# Patient Record
Sex: FEMALE | Race: WHITE | Employment: OTHER | URBAN - METROPOLITAN AREA
[De-identification: names, ages, dates, MRNs, and addresses within clinical notes are randomized per-mention and may not be internally consistent; named-entity substitution may affect disease eponyms.]

---

## 2018-02-14 ENCOUNTER — OFFICE VISIT (OUTPATIENT)
Dept: OPHTHALMOLOGY | Facility: CLINIC | Age: 83
End: 2018-02-14
Payer: COMMERCIAL

## 2018-02-14 DIAGNOSIS — Z96.1 PSEUDOPHAKIA: Primary | ICD-10-CM

## 2018-02-14 DIAGNOSIS — H35.371 EPIRETINAL MEMBRANE (ERM) OF RIGHT EYE: ICD-10-CM

## 2018-02-14 DIAGNOSIS — H43.813 POSTERIOR VITREOUS DETACHMENT OF BOTH EYES: ICD-10-CM

## 2018-02-14 PROBLEM — H35.373 EPIRETINAL MEMBRANE, BOTH EYES: Status: ACTIVE | Noted: 2018-02-14

## 2018-02-14 PROCEDURE — 92004 COMPRE OPH EXAM NEW PT 1/>: CPT | Performed by: OPHTHALMOLOGY

## 2018-02-14 PROCEDURE — 92134 CPTRZ OPH DX IMG PST SGM RTA: CPT | Performed by: OPHTHALMOLOGY

## 2018-02-14 RX ORDER — LISINOPRIL 10 MG/1
10 TABLET ORAL DAILY
Refills: 0 | COMMUNITY
Start: 2018-01-15

## 2018-02-14 ASSESSMENT — REFRACTION_WEARINGRX
OS_AXIS: 108
OS_SPHERE: -1.50
OD_CYLINDER: +0.50
OD_SPHERE: -1.00
OS_ADD: +2.50
SPECS_TYPE: PAL
OD_ADD: +2.50
OS_CYLINDER: +0.25
OD_AXIS: 045

## 2018-02-14 ASSESSMENT — REFRACTION_MANIFEST
OS_SPHERE: -1.75
OS_AXIS: 150
OD_AXIS: 029
OD_SPHERE: -1.75
OD_CYLINDER: +0.75
OS_CYLINDER: +0.25

## 2018-02-14 ASSESSMENT — SLIT LAMP EXAM - LIDS
COMMENTS: 2, DERMATOCHALASIS
COMMENTS: 2, DERMATOCHALASIS

## 2018-02-14 ASSESSMENT — VISUAL ACUITY
OD_CC+: -1+1
OS_CC: 20/50
METHOD: SNELLEN - LINEAR
OS_PH_CC: 20/30-2
OD_CC: 20/40
CORRECTION_TYPE: GLASSES
OS_CC+: -2
METHOD_MR: NO CHARGE, DIAGNOSTIC

## 2018-02-14 ASSESSMENT — CUP TO DISC RATIO
OD_RATIO: 0.4
OS_RATIO: 0.4

## 2018-02-14 ASSESSMENT — EXTERNAL EXAM - LEFT EYE: OS_EXAM: NORMAL

## 2018-02-14 ASSESSMENT — TONOMETRY
OD_IOP_MMHG: 16
OS_IOP_MMHG: 16
IOP_METHOD: APPLANATION

## 2018-02-14 ASSESSMENT — EXTERNAL EXAM - RIGHT EYE: OD_EXAM: NORMAL

## 2018-02-14 NOTE — PATIENT INSTRUCTIONS
Possible clouding of posterior capsule discussed.   Continue same glasses.  Use artificial tears up to 4 times daily both eyes. (Refresh Tears or Systane Ultra/Balance)   Call in October 2018 for an appointment in February 2019 for Complete Exam    Dr. Virgen [phone]

## 2018-02-14 NOTE — PROGRESS NOTES
Current Eye Medications: none     Subjective:  Consult from for retinal eval.  Referred from a good friend Lori Johnson  Saw an OD in July in [facility], was told to see Retina   Got scared and cancelled the appointments.  Had cataract surgery in both eyes about 10 years ago, says she does see a lacy film over the vision now in both eyes. Also blurred in the center vision of both eyes.  Has a small red bump on the left lower eyelid that was a stye.     Objective:  See Ophthalmology Exam.       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      Plan:  Possible clouding of posterior capsule discussed.   Continue same glasses.  Use artificial tears up to 4 times daily both eyes. (Refresh Tears or Systane Ultra/Balance)   Call in October 2018 for an appointment in February 2019 for Complete Exam    Dr. Virgen [phone]

## 2018-02-14 NOTE — LETTER
2/14/2018         RE: Corry Zelaya  [address] [address]        Dear Colleague,    Thank you for referring your patient, Corry Zelaya, to the [facility]. Please see a copy of my visit note below.     Current Eye Medications: none     Subjective:  Consult from for retinal eval.  Referred from a good friend Lori Johnson  Saw an OD in July in [facility], was told to see Retina   Got scared and cancelled the appointments.  Had cataract surgery in both eyes about 10 years ago, says she does see a lacy film over the vision now in both eyes. Also blurred in the center vision of both eyes.  Has a small red bump on the left lower eyelid that was a stye.     Objective:  See Ophthalmology Exam.       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      Plan:  Possible clouding of posterior capsule discussed.   Continue same glasses.  Use artificial tears up to 4 times daily both eyes. (Refresh Tears or Systane Ultra/Balance)   Call in October 2018 for an appointment in February 2019 for Complete Exam    Dr. Virgen [phone]         Again, thank you for allowing me to participate in the care of your patient.        Sincerely,        Uzair Virgen MD

## 2018-02-14 NOTE — MR AVS SNAPSHOT
"              After Visit Summary   2/14/2018    Corry Zelaya    MRN: [id number]           Patient Information     Date Of Birth          5/2/1934        Visit Information        Provider Department      2/14/2018 9:45 AM Uzair Virgen MD [facility]        Today's Diagnoses     Pseudophakia    -  1    Epiretinal membrane, both eyes        Posterior vitreous detachment of both eyes          Care Instructions    Possible clouding of posterior capsule discussed.   Continue same glasses.  Use artificial tears up to 4 times daily both eyes. (Refresh Tears or Systane Ultra/Balance)   Call in October 2018 for an appointment in February 2019 for Complete Exam    Dr. Virgen [phone]          Follow-ups after your visit        Who to contact     If you have questions or need follow up information about today's clinic visit or your schedule please contact [facility] directly at [phone].  Normal or non-critical lab and imaging results will be communicated to you by MyChart, letter or phone within 4 business days after the clinic has received the results. If you do not hear from us within 7 days, please contact the clinic through [employer] or phone. If you have a critical or abnormal lab result, we will notify you by phone as soon as possible.  Submit refill requests through [employer] or call your pharmacy and they will forward the refill request to us. Please allow 3 business days for your refill to be completed.          Additional Information About Your Visit        MyChart Information     [employer] lets you send messages to your doctor, view your test results, renew your prescriptions, schedule appointments and more. To sign up, go to [email or web address] . Click on \"Log in\" on the left side of the screen, which will take you to the Welcome page. Then click on \"Sign up Now\" on the right side of the page.     You will be asked to enter the access code listed below, as well as some " personal information. Please follow the directions to create your username and password.     Your access code is: [email or web address]  Expires: 5/15/2018 10:38 AM     Your access code will  in 90 days. If you need help or a new code, please call your [facility] clinic or [phone].        Care EveryWhere ID     This is your Care EveryWhere ID. This could be used by other organizations to access your [facility] medical records  [id number]         Blood Pressure from Last 3 Encounters:   No data found for BP    Weight from Last 3 Encounters:   No data found for Wt              We Performed the Following     [facility]ER[facility] OPHTHAL[facility]INA        Primary Care Provider Fax #    Physician No Ref-Primary [phone]       No address on file        Equal Access to Services     MIHAI WATTERS : Carla Zarate, waelva delgado, qaybta kaalmada gris, azul lopez . [facility] [phone].    ATENCIÓN: Si habla español, tiene a walton disposición servicios gratuitos de asistencia lingüística. Llame al [phone].    We comply with applicable federal civil rights laws and [place] laws. We do not discriminate on the basis of race, color, national origin, age, disability, sex, sexual orientation, or gender identity.            Thank you!     Thank you for choosing [facility] [place]  for your care. Our goal is always to provide you with excellent care. Hearing back from our patients is one way we can continue to improve our services. Please take a few minutes to complete the written survey that you may receive in the mail after your visit with us. Thank you!             Your Updated Medication List - Protect others around you: Learn how to safely use, store and throw away your medicines at www.disposemymeds.org.          This list is accurate as of 18 10:38 AM.  Always use your most recent med list.                   Brand Name Dispense Instructions for use  Diagnosis    lisinopril 10 MG tablet    PRINIVIL/ZESTRIL     Take 10 mg by mouth daily

## 2018-02-16 PROBLEM — H35.371 EPIRETINAL MEMBRANE (ERM) OF RIGHT EYE: Status: ACTIVE | Noted: 2018-02-16

## 2018-02-16 PROBLEM — H35.373 EPIRETINAL MEMBRANE, BOTH EYES: Status: RESOLVED | Noted: 2018-02-14 | Resolved: 2018-02-16

## 2020-02-26 ENCOUNTER — OFFICE VISIT (OUTPATIENT)
Dept: OPHTHALMOLOGY | Facility: CLINIC | Age: 85
End: 2020-02-26
Payer: COMMERCIAL

## 2020-02-26 DIAGNOSIS — Z96.1 PSEUDOPHAKIA: ICD-10-CM

## 2020-02-26 DIAGNOSIS — H53.9 VISUAL DISTURBANCE: Primary | ICD-10-CM

## 2020-02-26 DIAGNOSIS — G44.52 NEW DAILY PERSISTENT HEADACHE: ICD-10-CM

## 2020-02-26 DIAGNOSIS — H35.371 EPIRETINAL MEMBRANE (ERM) OF RIGHT EYE: ICD-10-CM

## 2020-02-26 DIAGNOSIS — R70.0 ELEVATED SED RATE: Primary | ICD-10-CM

## 2020-02-26 DIAGNOSIS — H53.10 SUBJECTIVE VISUAL DISTURBANCE: ICD-10-CM

## 2020-02-26 DIAGNOSIS — H43.813 POSTERIOR VITREOUS DETACHMENT OF BOTH EYES: ICD-10-CM

## 2020-02-26 LAB
CRP SERPL-MCNC: <2.9 MG/L (ref 0–8)
ERYTHROCYTE [SEDIMENTATION RATE] IN BLOOD BY WESTERGREN METHOD: 104 MM/H (ref 0–30)

## 2020-02-26 PROCEDURE — 92012 INTRM OPH EXAM EST PATIENT: CPT | Performed by: OPHTHALMOLOGY

## 2020-02-26 PROCEDURE — 86140 C-REACTIVE PROTEIN: CPT | Performed by: OPHTHALMOLOGY

## 2020-02-26 PROCEDURE — 36415 COLL VENOUS BLD VENIPUNCTURE: CPT | Performed by: OPHTHALMOLOGY

## 2020-02-26 PROCEDURE — 92134 CPTRZ OPH DX IMG PST SGM RTA: CPT | Performed by: OPHTHALMOLOGY

## 2020-02-26 PROCEDURE — 85652 RBC SED RATE AUTOMATED: CPT | Performed by: OPHTHALMOLOGY

## 2020-02-26 RX ORDER — METOPROLOL TARTRATE 25 MG/1
25 TABLET, FILM COATED ORAL 2 TIMES DAILY
COMMUNITY

## 2020-02-26 RX ORDER — METOPROLOL SUCCINATE 25 MG/1
TABLET, EXTENDED RELEASE ORAL EVERY 24 HOURS
COMMUNITY

## 2020-02-26 RX ORDER — ASPIRIN 81 MG/1
81 TABLET, CHEWABLE ORAL DAILY
COMMUNITY
End: 2020-02-26 | Stop reason: ALTCHOICE

## 2020-02-26 RX ORDER — ACETAMINOPHEN 160 MG
TABLET,DISINTEGRATING ORAL
COMMUNITY

## 2020-02-26 RX ORDER — PREDNISONE 20 MG/1
40 TABLET ORAL 2 TIMES DAILY
Qty: 60 TABLET | Refills: 1 | Status: SHIPPED | OUTPATIENT
Start: 2020-02-26

## 2020-02-26 ASSESSMENT — SLIT LAMP EXAM - LIDS
COMMENTS: 2+ DERMATOCHALASIS
COMMENTS: 2+ DERMATOCHALASIS

## 2020-02-26 ASSESSMENT — VISUAL ACUITY
OD_CC+: -2
OS_CC: 20/30
OD_CC: 20/40
CORRECTION_TYPE: GLASSES
METHOD: SNELLEN - LINEAR
OS_CC+: -2

## 2020-02-26 ASSESSMENT — TONOMETRY
OS_IOP_MMHG: 17
IOP_METHOD: APPLANATION
OD_IOP_MMHG: 17

## 2020-02-26 ASSESSMENT — CUP TO DISC RATIO
OD_RATIO: 0.4
OS_RATIO: 0.4

## 2020-02-26 ASSESSMENT — EXTERNAL EXAM - LEFT EYE: OS_EXAM: NORMAL

## 2020-02-26 ASSESSMENT — EXTERNAL EXAM - RIGHT EYE: OD_EXAM: NORMAL

## 2020-02-26 NOTE — LETTER
"    2/26/2020         RE: Corry Zelaya  [address] [place] [address]        Dear Colleague,    Thank you for referring your patient, Corry Zelaya, to the [facility]. Please see a copy of my visit note below.     Current Eye Medications:  None.       Subjective:  Starting on February 7th she has had more consistent episodes of mobile, zig-zag lines, in her peripheral vision of each eye, along with blurry central vision.  Occasionally she has a headache before the visual part, but other times the headache follows the visual symptoms.  She usually takes Advil at the onset of either the headache or visual symptoms.  An event occurred again yesterday which remained for about 2.5 hours total.   Overall, she feels her reading vision is decreasing.  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    She was recently hospitalized for stroke and heart symptoms.      Had unremarkable Echocardiogram, Carotid US, and brain MRI.  Headaches almost daily for 2.5 hours, eye symptoms not as long.  Some relief with nonsteroidal antiinflammatories.  Was on 325 ASA, now 81.  Had 30 day cardiac monitoring; not yet reported.  Denies jaw, scalp pain; no fever, significant weight loss.     Objective:  See Ophthalmology Exam.       Assessment:  Daily recent headaches with transient visual disturbances of indeterminate etiology.      Plan:  Obtain labs today (ESR, CRP) - will call with results.  Obtain OCT today - will call with any concerns.  Recommend seeing Neurology for evaluation.  Signs and symptoms of retinal detachment (shower of black floaters, frequent flashing even during day, curtain over part of visual field) discussed.  Return visit in 1 month for refraction and intraocular pressure check.     NB: ESR significantly elevated; CRP pending.  Discussed with patient.  " Will start Prednisone 40 mg twice daily and refer to AM for temporal artery biopsy in near future.    Uzair Virgen M.D.  [phone]           Again, thank you for allowing me to participate in the care of your patient.        Sincerely,        Uzair Virgen MD

## 2020-02-26 NOTE — PATIENT INSTRUCTIONS
Obtain labs today - will call with results.  Obtain OCT today - will call with any concerns.  Recommend seeing Neurology for evaluation.  Signs and symptoms of retinal detachment (shower of black floaters, frequent flashing even during day, curtain over part of visual field) discussed.  Return visit in 1 month for refraction and intraocular pressure check.     Uzair Virgen M.D.  [phone]

## 2020-02-26 NOTE — PROGRESS NOTES
" Current Eye Medications:  None.       Subjective:  Starting on February 7th she has had more consistent episodes of mobile, zig-zag lines, in her peripheral vision of each eye, along with blurry central vision.  Occasionally she has a headache before the visual part, but other times the headache follows the visual symptoms.  She usually takes Advil at the onset of either the headache or visual symptoms.  An event occurred again yesterday which remained for about 2.5 hours total.   Overall, she feels her reading vision is decreasing.  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    She was recently hospitalized for stroke and heart symptoms.      Had unremarkable Echocardiogram, Carotid US, and brain MRI.  Headaches almost daily for 2.5 hours, eye symptoms not as long.  Some relief with nonsteroidal antiinflammatories.  Was on 325 ASA, now 81.  Had 30 day cardiac monitoring; not yet reported.  Denies jaw, scalp pain; no fever, significant weight loss.     Objective:  See Ophthalmology Exam.       Assessment:  Daily recent headaches with transient visual disturbances of indeterminate etiology.      Plan:  Obtain labs today (ESR, CRP) - will call with results.  Obtain OCT today - will call with any concerns.  Recommend seeing Neurology for evaluation.  Signs and symptoms of retinal detachment (shower of black floaters, frequent flashing even during day, curtain over part of visual field) discussed.  Return visit in 1 month for refraction and intraocular pressure check.     NB: ESR significantly elevated; CRP pending.  Discussed with patient.  Will start Prednisone 40 mg twice daily and refer to AM for temporal artery biopsy in near future.    Uzair Virgen M.D.  [phone]         "

## 2020-02-27 ENCOUNTER — ANESTHESIA EVENT (OUTPATIENT)
Dept: SURGERY | Facility: AMBULATORY SURGERY CENTER | Age: 85
End: 2020-02-27

## 2020-02-27 ASSESSMENT — MIFFLIN-ST. JEOR: SCORE: 1097.25

## 2020-02-27 NOTE — ANESTHESIA PREPROCEDURE EVALUATION
"Anesthesia Pre-Procedure Evaluation    Patient: Corry Zelaya   MRN:     [id number] Gender:   female   Age:    85 year old :      1934        Preoperative Diagnosis: Elevated sed rate [R70.0]  Subjective visual disturbance [H53.10]   Procedure(s):  Left or right temporal artery biopsy     LABS:  CBC: No results found for: WBC, HGB, HCT, PLT  BMP: No results found for: NA, POTASSIUM, CHLORIDE, CO2, BUN, CR, GLC  COAGS: No results found for: PTT, INR, FIBR  POC: No results found for: BGM, HCG, HCGS  OTHER:   Lab Results   Component Value Date    CRP <2.9 2020     (H) 2020        Preop Vitals    BP Readings from Last 3 Encounters:   No data found for BP    Pulse Readings from Last 3 Encounters:   No data found for Pulse      Resp Readings from Last 3 Encounters:   No data found for Resp    SpO2 Readings from Last 3 Encounters:   No data found for SpO2      Temp Readings from Last 1 Encounters:   No data found for Temp    Ht Readings from Last 1 Encounters:   20 1.626 m (5' 4.02\")      Wt Readings from Last 1 Encounters:   20 66.7 kg (147 lb 0.8 oz)    Estimated body mass index is 25.23 kg/m  as calculated from the following:    Height as of this encounter: 1.626 m (5' 4.02\").    Weight as of this encounter: 66.7 kg (147 lb 0.8 oz).     LDA:        Past Medical History:   Diagnosis Date     Arthritis      Hypertension       Past Surgical History:   Procedure Laterality Date     CATARACT IOL, RT/LT        Allergies   Allergen Reactions     Penicillins Rash        Anesthesia Evaluation     .             ROS/MED HX    ENT/Pulmonary:  - neg pulmonary ROS     Neurologic: Comment: Vision changes      Cardiovascular:  - neg cardiovascular ROS       METS/Exercise Tolerance:  >4 METS   Hematologic:  - neg hematologic  ROS       Musculoskeletal:  - neg musculoskeletal ROS       GI/Hepatic:  - neg GI/hepatic ROS       Renal/Genitourinary:  - ROS Renal section negative       Endo:  - neg endo " ROS       Psychiatric:  - neg psychiatric ROS       Infectious Disease:  - neg infectious disease ROS       Malignancy:         Other: Comment: Elevated sed rate, presenting for temporal artery biopsy                        PHYSICAL EXAM:   Mental Status/Neuro: A/A/O   Airway: Facies: Feasible  Mallampati: I  Mouth/Opening: Full  TM distance: > 6 cm  Neck ROM: Full   Respiratory: Auscultation: CTAB     Resp. Rate: Normal     Resp. Effort: Normal      CV: Rhythm: Regular  Rate: Age appropriate  Heart: Normal Sounds  Edema: None   Comments:      Dental: Normal Dentition                Assessment:   ASA SCORE: 2    H&P: History and physical reviewed and following examination; no interval change.    NPO Status: NPO Appropriate     Plan:   Anes. Type:  MAC   Pre-Medication: None   Induction:  N/a   Airway: Native Airway   Access/Monitoring: PIV   Maintenance: N/a     Postop Plan:   Postop Pain: None  Postop Sedation/Airway: Not planned     PONV Management: Adult Risk Factors: Female   Prevention: Ondansetron     CONSENT: Direct conversation   Plan and risks discussed with: Patient   Blood Products: Consent Deferred (Minimal Blood Loss)                   John Poe MD

## 2020-02-28 ENCOUNTER — HOSPITAL ENCOUNTER (OUTPATIENT)
Facility: AMBULATORY SURGERY CENTER | Age: 85
Discharge: HOME | End: 2020-02-28
Attending: OPHTHALMOLOGY | Admitting: OPHTHALMOLOGY
Payer: COMMERCIAL

## 2020-02-28 ENCOUNTER — SURGERY (OUTPATIENT)
Age: 85
End: 2020-02-28
Payer: COMMERCIAL

## 2020-02-28 ENCOUNTER — ANESTHESIA (OUTPATIENT)
Dept: SURGERY | Facility: AMBULATORY SURGERY CENTER | Age: 85
End: 2020-02-28
Payer: COMMERCIAL

## 2020-02-28 VITALS
DIASTOLIC BLOOD PRESSURE: 68 MMHG | TEMPERATURE: 98.1 F | SYSTOLIC BLOOD PRESSURE: 138 MMHG | RESPIRATION RATE: 20 BRPM | HEIGHT: 64 IN | BODY MASS INDEX: 25.1 KG/M2 | OXYGEN SATURATION: 99 % | WEIGHT: 147.05 LBS

## 2020-02-28 DIAGNOSIS — R70.0 ELEVATED SED RATE: ICD-10-CM

## 2020-02-28 DIAGNOSIS — H53.10 SUBJECTIVE VISUAL DISTURBANCE: ICD-10-CM

## 2020-02-28 DIAGNOSIS — Z98.890 POST-OPERATIVE STATE: Primary | ICD-10-CM

## 2020-02-28 PROCEDURE — 88313 SPECIAL STAINS GROUP 2: CPT | Performed by: OPHTHALMOLOGY

## 2020-02-28 PROCEDURE — G8918 PT W/O PREOP ORDER IV AB PRO: HCPCS

## 2020-02-28 PROCEDURE — 37609 LIGATION/BX TEMPORAL ARTERY: CPT | Mod: RT

## 2020-02-28 PROCEDURE — 88305 TISSUE EXAM BY PATHOLOGIST: CPT | Performed by: OPHTHALMOLOGY

## 2020-02-28 PROCEDURE — G8907 PT DOC NO EVENTS ON DISCHARG: HCPCS

## 2020-02-28 PROCEDURE — 37609 LIGATION/BX TEMPORAL ARTERY: CPT | Mod: RT | Performed by: OPHTHALMOLOGY

## 2020-02-28 RX ORDER — ACETAMINOPHEN 325 MG/1
975 TABLET ORAL ONCE
Status: COMPLETED | OUTPATIENT
Start: 2020-02-28 | End: 2020-02-28

## 2020-02-28 RX ORDER — MEPERIDINE HYDROCHLORIDE 25 MG/ML
12.5 INJECTION INTRAMUSCULAR; INTRAVENOUS; SUBCUTANEOUS
Status: DISCONTINUED | OUTPATIENT
Start: 2020-02-28 | End: 2020-02-29 | Stop reason: HOSPADM

## 2020-02-28 RX ORDER — SODIUM CHLORIDE, SODIUM LACTATE, POTASSIUM CHLORIDE, CALCIUM CHLORIDE 600; 310; 30; 20 MG/100ML; MG/100ML; MG/100ML; MG/100ML
INJECTION, SOLUTION INTRAVENOUS CONTINUOUS
Status: DISCONTINUED | OUTPATIENT
Start: 2020-02-28 | End: 2020-02-29 | Stop reason: HOSPADM

## 2020-02-28 RX ORDER — BACITRACIN ZINC 500 [USP'U]/G
OINTMENT TOPICAL PRN
Status: DISCONTINUED | OUTPATIENT
Start: 2020-02-28 | End: 2020-02-28 | Stop reason: HOSPADM

## 2020-02-28 RX ORDER — LIDOCAINE HYDROCHLORIDE 20 MG/ML
INJECTION, SOLUTION INFILTRATION; PERINEURAL PRN
Status: DISCONTINUED | OUTPATIENT
Start: 2020-02-28 | End: 2020-02-28

## 2020-02-28 RX ORDER — PROPOFOL 10 MG/ML
INJECTION, EMULSION INTRAVENOUS PRN
Status: DISCONTINUED | OUTPATIENT
Start: 2020-02-28 | End: 2020-02-28

## 2020-02-28 RX ORDER — KETAMINE HYDROCHLORIDE 10 MG/ML
INJECTION, SOLUTION INTRAMUSCULAR; INTRAVENOUS PRN
Status: DISCONTINUED | OUTPATIENT
Start: 2020-02-28 | End: 2020-02-28

## 2020-02-28 RX ORDER — ONDANSETRON 2 MG/ML
4 INJECTION INTRAMUSCULAR; INTRAVENOUS EVERY 30 MIN PRN
Status: DISCONTINUED | OUTPATIENT
Start: 2020-02-28 | End: 2020-02-29 | Stop reason: HOSPADM

## 2020-02-28 RX ORDER — BACITRACIN ZINC 500 [USP'U]/G
OINTMENT TOPICAL 3 TIMES DAILY
Qty: 14 G | Refills: 0 | Status: SHIPPED | OUTPATIENT
Start: 2020-02-28

## 2020-02-28 RX ORDER — NALOXONE HYDROCHLORIDE 0.4 MG/ML
.1-.4 INJECTION, SOLUTION INTRAMUSCULAR; INTRAVENOUS; SUBCUTANEOUS
Status: DISCONTINUED | OUTPATIENT
Start: 2020-02-28 | End: 2020-02-29 | Stop reason: HOSPADM

## 2020-02-28 RX ORDER — OXYCODONE HYDROCHLORIDE 5 MG/1
5 TABLET ORAL EVERY 4 HOURS PRN
Status: DISCONTINUED | OUTPATIENT
Start: 2020-02-28 | End: 2020-02-29 | Stop reason: HOSPADM

## 2020-02-28 RX ORDER — ONDANSETRON 4 MG/1
4 TABLET, ORALLY DISINTEGRATING ORAL EVERY 30 MIN PRN
Status: DISCONTINUED | OUTPATIENT
Start: 2020-02-28 | End: 2020-02-29 | Stop reason: HOSPADM

## 2020-02-28 RX ORDER — SODIUM CHLORIDE, SODIUM LACTATE, POTASSIUM CHLORIDE, CALCIUM CHLORIDE 600; 310; 30; 20 MG/100ML; MG/100ML; MG/100ML; MG/100ML
INJECTION, SOLUTION INTRAVENOUS CONTINUOUS PRN
Status: DISCONTINUED | OUTPATIENT
Start: 2020-02-28 | End: 2020-02-28

## 2020-02-28 RX ORDER — FENTANYL CITRATE 50 UG/ML
25-50 INJECTION, SOLUTION INTRAMUSCULAR; INTRAVENOUS EVERY 5 MIN PRN
Status: DISCONTINUED | OUTPATIENT
Start: 2020-02-28 | End: 2020-02-29 | Stop reason: HOSPADM

## 2020-02-28 RX ADMIN — BACITRACIN ZINC 0.25 G: 500 OINTMENT TOPICAL at 11:23

## 2020-02-28 RX ADMIN — KETAMINE HYDROCHLORIDE 15 MG: 10 INJECTION, SOLUTION INTRAMUSCULAR; INTRAVENOUS at 11:09

## 2020-02-28 RX ADMIN — LIDOCAINE HYDROCHLORIDE 40 MG: 20 INJECTION, SOLUTION INFILTRATION; PERINEURAL at 11:05

## 2020-02-28 RX ADMIN — LIDOCAINE HYDROCHLORIDE 20 MG: 20 INJECTION, SOLUTION INFILTRATION; PERINEURAL at 11:19

## 2020-02-28 RX ADMIN — PROPOFOL 30 MG: 10 INJECTION, EMULSION INTRAVENOUS at 11:09

## 2020-02-28 RX ADMIN — ACETAMINOPHEN 650 MG: 325 TABLET ORAL at 10:32

## 2020-02-28 RX ADMIN — SODIUM CHLORIDE, SODIUM LACTATE, POTASSIUM CHLORIDE, CALCIUM CHLORIDE: 600; 310; 30; 20 INJECTION, SOLUTION INTRAVENOUS at 11:05

## 2020-02-28 NOTE — OP NOTE
PREOPERATIVE DIAGNOSIS:  Bilateral transient visual obscurations and elevated inflammatory markers, rule out temporal arteritis.      POSTOPERATIVE DIAGNOSIS:  Bilateral transient visual obscurations and elevated inflammatory markers, rule out temporal arteritis.      PROCEDURE:  Right temporal artery biopsy.      SURGEON:  Dalia Acosta MD, MD     ASSISTANTS:  Marce Delacruz MD     ANESTHESIA:  Monitored with local infiltration of a 50/50 mixture of 2% lidocaine with epinephrine and 0.5% Marcaine.      COMPLICATIONS:  None.      ESTIMATED BLOOD LOSS:  Less than 5 mL.     SPECIMEN: Temporal artery in formalin.     HISTORY OF PRESENT ILLNESS:  Corry Zelaya  presented with transient visual obscurations, severe headaches, and elevated inflammatory markers.  She was referred for a temporal artery biopsy to rule out temporal arteritis.  After the risks, benefits and alternatives were explained, informed consent was obtained.      DESCRIPTION OF PROCEDURE:  The patient was brought to the operating room and placed supine on the operating table.  IV sedation was given.  The temporal artery was palpated on the right side, a marking made extending from the ear superotemporally over the artery. The area was infiltrated with local anesthetic and the area was prepped and draped in the typical sterile fashion for oculoplastic surgery.  Attention was directed to the right side.  An incision was made with a 15 blade through the skin.  Subcutaneous tissue was dissected with the Medraon scissors. The artery was identified.  I dissected out the artery and  tagged the proximal and distal ends with 4-0 silk suture. The artery was cut with the Sriram scissors.  Hemostasis was obtained with monopolar cautery.  The incision was closed with interrupted buried 5-0 Vicryl sutures deep and 5-0 chromic sutures on the skin edges. Antibiotic ointment was applied.    The specimen was placed in formalin and sent for pathologic evaluation.  A  2.6-cm in situ biopsy was obtained.  The patient tolerated the procedure well and was taken to recovery room in stable condition.       Dalia Acosta MD

## 2020-02-28 NOTE — ANESTHESIA POSTPROCEDURE EVALUATION
Anesthesia POST Procedure Evaluation    Patient: Corry Zelaya   MRN:     [id number] Gender:   female   Age:    85 year old :      1934        Preoperative Diagnosis: Elevated sed rate [R70.0]  Subjective visual disturbance [H53.10]   Procedure(s):  right temporal artery biopsy   Postop Comments: No value filed.     Anesthesia Type: MAC       Disposition: Outpatient   Postop Pain Control: Uneventful            Sign Out: Well controlled pain   PONV: No   Neuro/Psych: Uneventful            Sign Out: Acceptable/Baseline neuro status   Airway/Respiratory: Uneventful            Sign Out: Acceptable/Baseline resp. status   CV/Hemodynamics: Uneventful            Sign Out: Acceptable CV status   Other NRE: NONE   DID A NON-ROUTINE EVENT OCCUR? No    Event details/Postop Comments:  Patient doing well post-operatively.  No significant issues.  Hemodynamically stable, pain well controlled, nausea well controlled.  Stable for discharge from the PACU           Last Anesthesia Record Vitals:  CRNA VITALS  2020 1104 - 2020 1204      2020             Pulse:  64    SpO2:  94 %          Last PACU Vitals:  Vitals Value Taken Time   /75 2020 11:40 AM   Temp 36.7  C (98  F) 2020 11:40 AM   Pulse     Resp 16 2020 11:40 AM   SpO2 97 % 2020 11:40 AM   Temp src     NIBP 110/58 2020 11:31 AM   Pulse 64 2020 11:35 AM   SpO2 94 % 2020 11:35 AM   Resp     Temp     Ht Rate     Temp 2           Electronically Signed By: John Poe MD, 2020, 1:23 PM

## 2020-02-28 NOTE — DISCHARGE INSTRUCTIONS
Post-operative Instructions  Ophthalmic Plastic and Reconstructive Surgery    Dalia Acosta M.D.     All instructions apply to the operated eye(s) or eyelid(s).    Wound care and personal care  ? If a patch or bandage has been placed, please leave this in place until seen by your physician. Ensure that the bandage does not get wet when you take a shower.  ? Apply ice compresses 15 minutes of every hour while awake for 2 days. As long as there is no further bleeding, after two days, switch to warm water compresses for five minutes, four times a day until seen by your physician.   ? You may shower or wash your hair the day after surgery. Do not go swimming for at least 2 weeks to prevent contamination of your wounds.  ? Do not apply make-up to the eyes or eyelids for 2 weeks after surgery.  ? Expect some swelling, bruising, black eye (even into the lower eyelids and cheeks). Also expect serum caking, crusting and discharge from the eye and/or incisions. A small amount of surface bleeding, and depending on the type of surgery, bleeding from the inside of the eyelid, is normal for the first 48 hours.  ? Avoid straining, bending at the waist, or lifting more than 15 pounds for 10 days. Activities that raise your blood pressure can worsen swelling, cause bleeding, and breaking of sutures. Like wise, sleeping with your head slightly elevated for the first several days can help swelling resolve more quickly.   ? Do continue to ambulate (walk) as you normally would - being sedentary after surgery can cause blood clots.   ? Your eye(s) and eyelid(s) may be painful and tender. This is normal after surgery.      Contact information and follow-up  ? Return to the Eye Clinic for a follow-up appointment with your physician as scheduled. If no appointment has been scheduled:   - [facility] eye clinic: [phone] for an appointment with Dr. Acosta within 1 to 2 weeks from your date of surgery.   -  [facility]  [place] eye clinic: [phone] for an appointment with Dr. Acosta within 1 to 2 weeks from your date of surgery.     ? For severe pain, bleeding, or loss of vision, call the [facility] at [phone] or [facility] at [phone].     After hours or on weekends and holidays, call [phone] and ask to speak with the ophthalmologist on call.    An on call person can be reached after hours for concerns. The on call doctor should not call in medication refill requests after hours or on weekends, so please plan accordingly. An effort has been made to provide adequate pain medications following every surgery, and refills will not be provided in most instances. Narcotic pain medications cannot be called in.     Activity restrictions and driving  ? Avoid heavy lifting, bending, exercise or strenuous activity for 1 week after surgery.  You may resume other activities and return to work as tolerated.  ? You may not resume driving if you are using narcotic pain medications (such as Norco, Percocet, Tylenol #3).    Medications  ? Restart all regular home medications and eye drops. If you take Plavix or  Aspirin on a regular basis, wait for 72 hours after your surgery before restarting these in order to decrease the risk of bleeding complications.  ? Avoid aspirin and aspirin-like medications (Motrin, Aleve, Ibuprofen, [place] etc) for 72 hours to reduce the risk of bleeding. You may take Tylenol (acetaminophen) for pain.  ? In addition to your home medications, take the following post-operative medications as prescribed by your physician.    ? Apply antibiotic ointment to all sutures three times a day, and into the operated eye(s) at night.      [facility]  Same-Day Surgery   Adult Discharge Orders & Instructions   For 24 hours after surgery  1. Get plenty of rest.  A responsible adult must stay with you for at least 24 hours after you leave  the hospital.   2. Do not drive or use heavy equipment.  If you have weakness or tingling, don't drive or use heavy equipment until this feeling goes away.  3. Do not drink alcohol.  4. Avoid strenuous or risky activities.  Ask for help when climbing stairs.   5. You may feel lightheaded.  IF so, sit for a few minutes before standing.  Have someone help you get up.   6. If you have nausea (feel sick to your stomach): Drink only clear liquids such as apple juice, ginger ale, broth or 7-Up.  Rest may also help.  Be sure to drink enough fluids.  Move to a regular diet as you feel able.  7. You may have a slight fever. Call the doctor if your fever is over 100 F (37.7 C) (taken under the tongue) or lasts longer than 24 hours.  8. You may have a dry mouth, a sore throat, muscle aches or trouble sleeping.  These should go away after 24 hours.  9. Do not make important or legal decisions.   Call your doctor for any of the followin.  Signs of infection (fever, growing tenderness at the surgery site, a large amount of drainage or bleeding, severe pain, foul-smelling drainage, redness, swelling).    2. It has been over 8 to 10 hours since surgery and you are still not able to urinate (pass water).    3.  Headache for over 24 hours.  To contact a doctor, call  [phone].    **Acetaminophen (Tylenol)  975mg taken at 10:30am.

## 2020-02-28 NOTE — ANESTHESIA CARE TRANSFER NOTE
Patient: Corry Zelaya    Procedure(s):  right temporal artery biopsy    Diagnosis: Elevated sed rate [R70.0]  Subjective visual disturbance [H53.10]  Diagnosis Additional Information: No value filed.    Anesthesia Type:   MAC     Note:  Airway :Room Air  Patient transferred to:Phase II  Comments: Care of Transfer report given to RN.  Spont Respirations. Responds   Easy.Handoff Report: Identifed the Patient, Identified the Reponsible Provider, Reviewed the pertinent medical history, Discussed the surgical course, Reviewed Intra-OP anesthesia mangement and issues during anesthesia, Set expectations for post-procedure period and Allowed opportunity for questions and acknowledgement of understanding      Vitals: (Last set prior to Anesthesia Care Transfer)    CR[facility] VITALS  2/28/2020 1104 - 2/28/2020 1138      2/28/2020             Pulse:  64    SpO2:  94 %                Electronically Signed By: DORIE ACHARYA CRNA  February 28, 2020  11:38 AM

## 2020-02-29 ENCOUNTER — TELEPHONE (OUTPATIENT)
Dept: OPHTHALMOLOGY | Facility: CLINIC | Age: 85
End: 2020-02-29

## 2020-03-01 NOTE — TELEPHONE ENCOUNTER
Follow up call to patient.  Awaiting result of biopsy.  Headaches and visual symptoms have improved with Prednisone.  Suggested she decrease to 20 mg twice daily and take with meals; Tums if heartburn.  Uzair Virgen M.D.  [phone]

## 2020-03-03 LAB — COPATH REPORT: NORMAL

## 2020-03-17 ENCOUNTER — TELEPHONE (OUTPATIENT)
Dept: OPHTHALMOLOGY | Facility: CLINIC | Age: 85
End: 2020-03-17

## 2020-03-17 NOTE — TELEPHONE ENCOUNTER
Corry Zelaya called indicating she cancelled her appointment with Dr. Márquez. She was wondering if she should continue taking Prednisone. Please return phone call to discuss. [phone].

## 2020-03-17 NOTE — TELEPHONE ENCOUNTER
Discussed with patient.  She cancelled appt with Dr. Márquez due to CoVid concerns.  States continues mostly headache free and no visual symptoms.  Has had some stomach discomfort at night.  Suggested Pred taper for now - 10 mg twice daily for one week, then 5 mg twice daily for one week, then 5 mg daily.  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  Could also take Pepcid.  I will call her in follow up next week.  Advised to contact PCP if stomach pain worsens or if notes blood or melanotic stool.  Has appt with PCP for next week.  Uzair Virgen M.D.

## 2020-03-17 NOTE — TELEPHONE ENCOUNTER
Pt called and asked  How long she needs to taking prednisolone , she is now taking 40 mg daily,20 mg in morning and 20mg in evening.I told pt that Dr Virgen will be calling her this afternoon about this matter.

## 2020-03-23 ENCOUNTER — TELEPHONE (OUTPATIENT)
Dept: OPHTHALMOLOGY | Facility: CLINIC | Age: 85
End: 2020-03-23

## 2020-03-23 NOTE — TELEPHONE ENCOUNTER
Discussion with patient.  Now on Pred 10 mg twice daily; will go to 5 mg twice daily starting tomorrow.  No recurrence of headache or visual symptoms.  Has been taking Ranitidine and has not had stomach discomfort at night; will be switching to Pepcid soon.  She cancelled her PCP appt; I mentioned it would be a good idea to recheck ESR in near future.  I will call her back again in one week.  Uzair Virgen M.D.

## 2020-03-30 ENCOUNTER — TELEPHONE (OUTPATIENT)
Dept: OPHTHALMOLOGY | Facility: CLINIC | Age: 85
End: 2020-03-30

## 2020-03-30 NOTE — TELEPHONE ENCOUNTER
Discussion with patient.  No headaches or visual symptoms.  Using Ranitidine daily; no significant abdominal pain.  Will decrease to 5 mg Prednisone daily for one week, then plan 2.5 mg daily for one week, then discontinue.  Again, discussed if able would like ESR repeated, but patient reluctant to leave home at this time.  I will contact patient again next week, and she will call if problems.

## 2020-05-08 ENCOUNTER — TELEPHONE (OUTPATIENT)
Dept: OPHTHALMOLOGY | Facility: CLINIC | Age: 85
End: 2020-05-08

## 2020-05-08 NOTE — TELEPHONE ENCOUNTER
Discussion with patient.  Has been off Prednisone for couple weeks after 2-3 week taper.  Occasional headache but not severe and no recurrence of vision problems.  Continues to take Pepcid, but no significant abdominal discomfort.  On usual BP meds.  Will observe for now.  Will try to get lab follow up and perhaps [facility] neuro-ophth opinion when COVID situation allows.   Uzair Virgen M.D.

## 2020-07-13 ENCOUNTER — APPOINTMENT (OUTPATIENT)
Age: 85
Setting detail: DERMATOLOGY
End: 2020-07-13

## 2020-07-13 VITALS — HEIGHT: 61 IN | RESPIRATION RATE: 16 BRPM | WEIGHT: 138 LBS

## 2020-07-13 DIAGNOSIS — L72.0 EPIDERMAL CYST: ICD-10-CM

## 2020-07-13 DIAGNOSIS — L65.9 NONSCARRING HAIR LOSS, UNSPECIFIED: ICD-10-CM

## 2020-07-13 PROCEDURE — OTHER BENIGN DESTRUCTION COSMETIC: OTHER

## 2020-07-13 PROCEDURE — 99202 OFFICE O/P NEW SF 15 MIN: CPT

## 2020-07-13 PROCEDURE — OTHER COUNSELING: OTHER

## 2020-07-13 ASSESSMENT — LOCATION SIMPLE DESCRIPTION DERM
LOCATION SIMPLE: RIGHT NOSE
LOCATION SIMPLE: ANTERIOR SCALP
LOCATION SIMPLE: NOSE
LOCATION SIMPLE: RIGHT CHEEK
LOCATION SIMPLE: NOSE
LOCATION SIMPLE: LEFT CHEEK

## 2020-07-13 ASSESSMENT — LOCATION DETAILED DESCRIPTION DERM
LOCATION DETAILED: LEFT MEDIAL MALAR CHEEK
LOCATION DETAILED: NASAL DORSUM
LOCATION DETAILED: RIGHT INFERIOR MEDIAL MALAR CHEEK
LOCATION DETAILED: RIGHT NASAL SIDEWALL
LOCATION DETAILED: RIGHT MEDIAL MALAR CHEEK
LOCATION DETAILED: RIGHT CENTRAL MALAR CHEEK
LOCATION DETAILED: LEFT INFERIOR MEDIAL MALAR CHEEK
LOCATION DETAILED: NASAL DORSUM
LOCATION DETAILED: MID-FRONTAL SCALP

## 2020-07-13 ASSESSMENT — LOCATION ZONE DERM
LOCATION ZONE: NOSE
LOCATION ZONE: FACE
LOCATION ZONE: SCALP
LOCATION ZONE: NOSE

## 2020-07-13 NOTE — PROCEDURE: BENIGN DESTRUCTION COSMETIC
Consent: - Risks discussed include but are not limited to pain, crusting, scabbing, scarring, temporary or permanent pigmentary change, recurrence, incomplete resolution, and infection.\\n- The patient understands that the procedure is cosmetic in nature and is not covered by or billable to insurance.
Anesthesia Volume In Cc: 0.5
Post-Care Instructions: - Patient was instructed to avoid picking at any of the treated lesions.\\n- Vaseline ointment or Aquaphor can be applied to any open wounds daily until healed.
Price (Use Numbers Only, No Special Characters Or $): 100
Detail Level: Detailed

## 2020-07-13 NOTE — HPI: HAIR LOSS
How Severe Is Your Hair Loss?: mild
Additional History: Has itch in scalp but has always been itchy.  Has not tried anything yet. Still losing hair. No changes to any medications. No history of hair loss. Sons have hair loss.  Denies autoimmunne history of thyroid problems or anemia.  Denies history of bowel surgery. More stressed now.

## 2020-07-13 NOTE — PROCEDURE: COUNSELING
Detail Level: Zone
Detail Level: Detailed
Patient Specific Counseling (Will Not Stick From Patient To Patient): - Patient would not like to move forward to have punch biopsies done today.\\nRecommend start using Rogaine solution daily, and take oral supplement of Biotin. She will consider the blood work. Discussed possibly telogen effluvium.

## 2020-07-15 ENCOUNTER — TELEPHONE (OUTPATIENT)
Dept: OPHTHALMOLOGY | Facility: CLINIC | Age: 85
End: 2020-07-15

## 2020-07-15 NOTE — TELEPHONE ENCOUNTER
Called patient to check up on her and see if she wanted to reschedule her appt from 3-25-20.  She states that she is not comfortable coming into a healthcare setting right now due to COVID 19. She would like to call back and be seen in the spring if possible instead. I asked that she call about two months before the time she would want to come in and we can make that CE for her. She will call with any problems in the meantime. Very nice and wants to protect herself.

## (undated) DEVICE — GLOVE PROTEXIS W/NEU-THERA 7.5  2D73TE75

## (undated) DEVICE — GEL ULTRASOUND AQUASONIC 20GM 01-01

## (undated) DEVICE — NDL 30GA 0.5" 305106

## (undated) DEVICE — DRAPE SPLIT EENT 76X124" 3X28" 9447

## (undated) DEVICE — TUBING SUCTION 10'X3/16" N510

## (undated) DEVICE — SYR 03ML LL W/O NDL

## (undated) DEVICE — ESU PENCIL W/HOLSTER

## (undated) DEVICE — SOL WATER IRRIG 1000ML BOTTLE 07139-09

## (undated) DEVICE — ESU ELEC NDL 1" COATED/INSULATED E1465

## (undated) DEVICE — PACK MINOR EYE

## (undated) DEVICE — MARKER SKIN DOUBLE TIP W/FLEXI-RULER W/LABELS

## (undated) RX ORDER — ACETAMINOPHEN 325 MG/1
TABLET ORAL
Status: DISPENSED
Start: 2020-02-28

## (undated) RX ORDER — PROPOFOL 10 MG/ML
INJECTION, EMULSION INTRAVENOUS
Status: DISPENSED
Start: 2020-02-28